# Patient Record
Sex: MALE | Race: BLACK OR AFRICAN AMERICAN | NOT HISPANIC OR LATINO | URBAN - METROPOLITAN AREA
[De-identification: names, ages, dates, MRNs, and addresses within clinical notes are randomized per-mention and may not be internally consistent; named-entity substitution may affect disease eponyms.]

---

## 2022-10-20 ENCOUNTER — AMBULATORY SURGICAL CENTER (OUTPATIENT)
Dept: URBAN - METROPOLITAN AREA SURGERY 2 | Facility: SURGERY | Age: 47
End: 2022-10-20
Payer: COMMERCIAL

## 2022-10-20 ENCOUNTER — OFFICE (OUTPATIENT)
Dept: URBAN - METROPOLITAN AREA PATHOLOGY 20 | Facility: PATHOLOGY | Age: 47
End: 2022-10-20
Payer: COMMERCIAL

## 2022-10-20 VITALS
DIASTOLIC BLOOD PRESSURE: 99 MMHG | RESPIRATION RATE: 16 BRPM | DIASTOLIC BLOOD PRESSURE: 64 MMHG | HEART RATE: 72 BPM | HEART RATE: 72 BPM | SYSTOLIC BLOOD PRESSURE: 153 MMHG | SYSTOLIC BLOOD PRESSURE: 104 MMHG | OXYGEN SATURATION: 100 % | SYSTOLIC BLOOD PRESSURE: 117 MMHG | RESPIRATION RATE: 14 BRPM | HEART RATE: 73 BPM | DIASTOLIC BLOOD PRESSURE: 58 MMHG | HEIGHT: 71 IN | HEIGHT: 71 IN | HEART RATE: 67 BPM | SYSTOLIC BLOOD PRESSURE: 101 MMHG | DIASTOLIC BLOOD PRESSURE: 58 MMHG | HEART RATE: 79 BPM | OXYGEN SATURATION: 97 % | SYSTOLIC BLOOD PRESSURE: 153 MMHG | WEIGHT: 188 LBS | DIASTOLIC BLOOD PRESSURE: 67 MMHG | WEIGHT: 188 LBS | DIASTOLIC BLOOD PRESSURE: 99 MMHG | HEIGHT: 71 IN | TEMPERATURE: 97.5 F | OXYGEN SATURATION: 98 % | DIASTOLIC BLOOD PRESSURE: 64 MMHG | SYSTOLIC BLOOD PRESSURE: 104 MMHG | TEMPERATURE: 97.5 F | TEMPERATURE: 97.5 F | OXYGEN SATURATION: 100 % | RESPIRATION RATE: 14 BRPM | DIASTOLIC BLOOD PRESSURE: 74 MMHG | OXYGEN SATURATION: 100 % | RESPIRATION RATE: 16 BRPM | SYSTOLIC BLOOD PRESSURE: 117 MMHG | SYSTOLIC BLOOD PRESSURE: 117 MMHG | DIASTOLIC BLOOD PRESSURE: 74 MMHG | SYSTOLIC BLOOD PRESSURE: 113 MMHG | HEART RATE: 67 BPM | HEART RATE: 73 BPM | RESPIRATION RATE: 16 BRPM | SYSTOLIC BLOOD PRESSURE: 113 MMHG | HEART RATE: 67 BPM | DIASTOLIC BLOOD PRESSURE: 58 MMHG | DIASTOLIC BLOOD PRESSURE: 67 MMHG | HEART RATE: 79 BPM | TEMPERATURE: 97.3 F | WEIGHT: 188 LBS | TEMPERATURE: 97.3 F | OXYGEN SATURATION: 97 % | HEART RATE: 79 BPM | SYSTOLIC BLOOD PRESSURE: 101 MMHG | RESPIRATION RATE: 14 BRPM | SYSTOLIC BLOOD PRESSURE: 153 MMHG | TEMPERATURE: 97.3 F | HEART RATE: 82 BPM | SYSTOLIC BLOOD PRESSURE: 113 MMHG | OXYGEN SATURATION: 98 % | HEART RATE: 82 BPM | SYSTOLIC BLOOD PRESSURE: 101 MMHG | DIASTOLIC BLOOD PRESSURE: 64 MMHG | HEART RATE: 72 BPM | OXYGEN SATURATION: 98 % | DIASTOLIC BLOOD PRESSURE: 74 MMHG | HEART RATE: 73 BPM | DIASTOLIC BLOOD PRESSURE: 67 MMHG | SYSTOLIC BLOOD PRESSURE: 104 MMHG | OXYGEN SATURATION: 97 % | DIASTOLIC BLOOD PRESSURE: 99 MMHG | HEART RATE: 82 BPM

## 2022-10-20 DIAGNOSIS — K62.1 RECTAL POLYP: ICD-10-CM

## 2022-10-20 DIAGNOSIS — D12.3 BENIGN NEOPLASM OF TRANSVERSE COLON: ICD-10-CM

## 2022-10-20 DIAGNOSIS — Z12.11 ENCOUNTER FOR SCREENING FOR MALIGNANT NEOPLASM OF COLON: ICD-10-CM

## 2022-10-20 PROBLEM — K63.5 POLYP OF COLON: Status: ACTIVE | Noted: 2022-10-20

## 2022-10-20 PROCEDURE — 45380 COLONOSCOPY AND BIOPSY: CPT | Mod: 33 | Performed by: INTERNAL MEDICINE

## 2022-10-20 RX ADMIN — PROPOFOL 250 MG: 10 INJECTION, EMULSION INTRAVENOUS at 07:53

## 2023-06-23 ENCOUNTER — OFFICE (OUTPATIENT)
Dept: URBAN - METROPOLITAN AREA CLINIC 14 | Facility: CLINIC | Age: 48
End: 2023-06-23

## 2023-06-23 VITALS
DIASTOLIC BLOOD PRESSURE: 92 MMHG | HEIGHT: 71 IN | HEART RATE: 97 BPM | OXYGEN SATURATION: 98 % | SYSTOLIC BLOOD PRESSURE: 126 MMHG | WEIGHT: 192 LBS

## 2023-06-23 DIAGNOSIS — K92.1 MELENA: ICD-10-CM

## 2023-06-23 DIAGNOSIS — K64.8 OTHER HEMORRHOIDS: ICD-10-CM

## 2023-06-23 DIAGNOSIS — K62.89 OTHER SPECIFIED DISEASES OF ANUS AND RECTUM: ICD-10-CM

## 2023-06-23 PROCEDURE — 99214 OFFICE O/P EST MOD 30 MIN: CPT

## 2023-06-23 RX ORDER — HYDROCORTISONE ACETATE 25 MG/1
50 SUPPOSITORY RECTAL
Qty: 30 | Refills: 1 | Status: ACTIVE
Start: 2023-06-23

## 2023-06-23 RX ORDER — LIDOCAINE 50 MG/G
CREAM TOPICAL
Qty: 30 | Refills: 2 | Status: ACTIVE
Start: 2023-06-23

## 2023-06-23 NOTE — SERVICEHPINOTES
47-year-old male who presents with complaint of hemorrhoids. Last colonoscopy 10/20/2022 with large internal hemorrhoids noted. There was a 2 mm polyp in the hepatic flexure (TA) and a 2 mm polyp in the rectum (hyperplastic polyp). Recommended repeat in 7 years.   In clinic today, patient reports he has had hemorrhoids for his long as he can remember.  For the last approximately 1 year, these have been more bothersome on a near daily basis.  He reports he will have good days but has more bad days than good.  He is active and exercises regularly.  He reports he tries to drink plenty of water and is using MiraLax once or twice a day for constipation, which he started in September of 2022.  With this he will have 2-3 bowel movements a day, usually 1 in the morning, 1 in the evening.  He reports he will occasionally have a 3rd 1 in the afternoon while at work.  He reports previously having to strain but has not had to strain since starting MiraLax.  He denies any burning, itching.  He will have occasional scant bleeding on toilet paper, approximately once every 3 weeks.  No burning or itching.  He will occasionally have pain, especially if hemorrhoid is protruding.  He reports pain will typically persistent ill hemorrhoid goes back in.  He does endorse some uncomfortableness after bowel movements.  He has been using lidocaine ointment as needed.  No unintentional weight loss.

## 2023-06-23 NOTE — SERVICENOTES
Last colonoscopy 10/20/2022 with large internal hemorrhoids, small tubular adenoma at  hepatic flexure and small hyperplastic polyp of rectum, recommended repeat in 7 years.

## 2023-07-06 ENCOUNTER — OFFICE (OUTPATIENT)
Dept: URBAN - METROPOLITAN AREA CLINIC 11 | Facility: CLINIC | Age: 48
End: 2023-07-06

## 2023-07-06 VITALS
SYSTOLIC BLOOD PRESSURE: 144 MMHG | HEIGHT: 71 IN | OXYGEN SATURATION: 99 % | DIASTOLIC BLOOD PRESSURE: 99 MMHG | HEART RATE: 96 BPM | WEIGHT: 193 LBS

## 2023-07-06 DIAGNOSIS — K64.8 OTHER HEMORRHOIDS: ICD-10-CM

## 2023-07-06 PROCEDURE — 46221 LIGATION OF HEMORRHOID(S): CPT | Performed by: INTERNAL MEDICINE

## 2023-07-31 ENCOUNTER — OFFICE (OUTPATIENT)
Dept: URBAN - METROPOLITAN AREA CLINIC 11 | Facility: CLINIC | Age: 48
End: 2023-07-31

## 2023-07-31 VITALS
WEIGHT: 192 LBS | OXYGEN SATURATION: 97 % | SYSTOLIC BLOOD PRESSURE: 139 MMHG | HEIGHT: 71 IN | DIASTOLIC BLOOD PRESSURE: 92 MMHG | HEART RATE: 91 BPM

## 2023-07-31 DIAGNOSIS — K64.8 OTHER HEMORRHOIDS: ICD-10-CM

## 2023-07-31 PROCEDURE — 46221 LIGATION OF HEMORRHOID(S): CPT | Performed by: INTERNAL MEDICINE
